# Patient Record
Sex: FEMALE | Race: WHITE | NOT HISPANIC OR LATINO | Employment: UNEMPLOYED | URBAN - METROPOLITAN AREA
[De-identification: names, ages, dates, MRNs, and addresses within clinical notes are randomized per-mention and may not be internally consistent; named-entity substitution may affect disease eponyms.]

---

## 2024-06-17 ENCOUNTER — OFFICE VISIT (OUTPATIENT)
Dept: FAMILY MEDICINE CLINIC | Facility: CLINIC | Age: 39
End: 2024-06-17

## 2024-06-17 VITALS
SYSTOLIC BLOOD PRESSURE: 126 MMHG | BODY MASS INDEX: 39.72 KG/M2 | WEIGHT: 238.4 LBS | HEART RATE: 76 BPM | DIASTOLIC BLOOD PRESSURE: 74 MMHG | HEIGHT: 65 IN | OXYGEN SATURATION: 98 %

## 2024-06-17 DIAGNOSIS — R10.9 FLANK PAIN: ICD-10-CM

## 2024-06-17 DIAGNOSIS — M25.50 ARTHRALGIA, UNSPECIFIED JOINT: ICD-10-CM

## 2024-06-17 DIAGNOSIS — F41.8 ANXIETY WITH DEPRESSION: Primary | ICD-10-CM

## 2024-06-17 DIAGNOSIS — R10.2 PELVIC PAIN: ICD-10-CM

## 2024-06-17 DIAGNOSIS — R53.83 OTHER FATIGUE: ICD-10-CM

## 2024-06-17 LAB
BILIRUB BLD-MCNC: NEGATIVE MG/DL
CLARITY, POC: CLEAR
COLOR UR: YELLOW
GLUCOSE UR STRIP-MCNC: NEGATIVE MG/DL
KETONES UR QL: NEGATIVE
LEUKOCYTE EST, POC: NEGATIVE
NITRITE UR-MCNC: NEGATIVE MG/ML
PH UR: 8 [PH] (ref 5–8)
PROT UR STRIP-MCNC: NEGATIVE MG/DL
RBC # UR STRIP: NEGATIVE /UL
SP GR UR: 1.01 (ref 1–1.03)
UROBILINOGEN UR QL: NORMAL

## 2024-06-17 PROCEDURE — 99203 OFFICE O/P NEW LOW 30 MIN: CPT | Performed by: NURSE PRACTITIONER

## 2024-06-17 PROCEDURE — 81002 URINALYSIS NONAUTO W/O SCOPE: CPT | Performed by: NURSE PRACTITIONER

## 2024-06-17 RX ORDER — ESCITALOPRAM OXALATE 20 MG/1
20 TABLET ORAL DAILY
COMMUNITY
Start: 2016-03-17

## 2024-06-17 NOTE — PROGRESS NOTES
Chief Complaint  Establish Care (New to the area from [place], has been very fatigue and feels like she's just falling apart. She's very tired and has pain in lower right side. )    Subjective        Lynn Richardson presents to [facility]  History of Present Illness    Presents today to establish care as a new patient.  She was previously living in [place].  Did not have a primary care there.  Hasn't seen anyone other than ob/gyn for a while.      A little bit before COVID started getting frequent UTI's.  That has gotten better.   Recently has been getting random hives if she hits her arm on anything.     Wakes up in the morning with joint pain and hands, feet, and face are swollen.     Has been having pain in lower right abdomen that radiates sometimes into right flank area.  That comes and goes. Has aunt and sister with ovarian cyst.  Seems like maybe worse when she is ovulating.      Depression/anxiety-lexapro 20mg does well to control. Has been back on for about a year  Denies SI or HI    Review of Systems   Constitutional:  Positive for fatigue. Negative for fever.   Respiratory:  Negative for cough, shortness of breath and wheezing.         When takes deep breath lungs burn last couple days, denies cold symptoms   Cardiovascular:  Negative for chest pain.   Gastrointestinal:  Positive for constipation (for last year) and nausea. Negative for abdominal pain, diarrhea and vomiting.        Hemorrhoids for last year   Genitourinary:  Positive for flank pain (from right pelvic pain into flank). Negative for dysuria and urgency.   Musculoskeletal:  Positive for joint swelling and myalgias.   Skin:         Hives at times, sometimes just last 10-15 min.  Sometimes will go days without.  Allergy medication helps   Neurological:  Positive for headaches (attributed to caffeine reductions). Negative for dizziness.   Psychiatric/Behavioral:  Negative for dysphoric mood and suicidal ideas.  "The patient is not nervous/anxious.            Objective   Vital Signs:  /74   Pulse 76   Ht 165.1 cm (65\")   Wt 108 kg (238 lb 6.4 oz)   SpO2 98%   BMI 39.67 kg/m²   Estimated body mass index is 39.67 kg/m² as calculated from the following:    Height as of this encounter: 165.1 cm (65\").    Weight as of this encounter: 108 kg (238 lb 6.4 oz).             Physical Exam  Constitutional:       Appearance: Normal appearance.   Skin:     General: Skin is warm and dry.   Neurological:      Mental Status: She is alert and oriented to person, place, and time.   Psychiatric:         Mood and Affect: Mood normal.         Behavior: Behavior normal.         Thought Content: Thought content normal.         Judgment: Judgment normal.        Result Review :                     Assessment and Plan     Diagnoses and all orders for this visit:    1. Anxiety with depression (Primary)  -     CBC & Differential  -     Comprehensive Metabolic Panel  -     TSH Rfx On Abnormal To Free T4  -     Rheumatoid Factor  -     Vitamin B12  -     Vitamin D,25-Hydroxy  -     FSH & LH  -     Cortisol  -     DWIGHT Direct Reflex to 11 [employer]  -     Cyclic citrul peptide antibody, IgG/IgA  -     CK  -     C-reactive protein  -     Sedimentation rate    2. Flank pain  -     POC Urinalysis Dipstick    3. Arthralgia, unspecified joint  -     CBC & Differential  -     Comprehensive Metabolic Panel  -     TSH Rfx On Abnormal To Free T4  -     Rheumatoid Factor  -     Vitamin B12  -     Vitamin D,25-Hydroxy  -     FSH & LH  -     Cortisol  -     DWIGHT Direct Reflex to 11 [employer]  -     Cyclic citrul peptide antibody, IgG/IgA  -     CK  -     C-reactive protein  -     Sedimentation rate    4. Other fatigue  -     CBC & Differential  -     Comprehensive Metabolic Panel  -     TSH Rfx On Abnormal To Free T4  -     Rheumatoid Factor  -     Vitamin B12  -     Vitamin D,25-Hydroxy  -     FSH & LH  -     Cortisol  -     DWIGHT Direct Reflex to 11 " Biomarker  -     Cyclic citrul peptide antibody, IgG/IgA  -     CK  -     C-reactive protein  -     Sedimentation rate    5. Pelvic pain  -     US Non-ob Transvaginal; Future  -     CBC & Differential  -     Comprehensive Metabolic Panel  -     TSH Rfx On Abnormal To Free T4  -     Rheumatoid Factor  -     Vitamin B12  -     Vitamin D,25-Hydroxy  -     FSH & LH  -     Cortisol  -     DWIGHT Direct Reflex to 11 [employer]arker  -     Cyclic citrul peptide antibody, IgG/IgA  -     CK  -     C-reactive protein  -     Sedimentation rate      Brief Urine Lab Results  (Last result in the past 365 days)        Color   Clarity   Blood   Leuk Est   Nitrite   Protein   CREAT   Urine HCG        06/17/24 1413 Yellow   Clear   Negative   Negative   Negative   Negative                 Continue Lexapro.  Patient does not need any refills currently will start with ultrasound for pelvic pain.    Would like to start with labs for review for arthritis and autoimmune panel.  Also checking other labs.  Will call with results any changes needed plan of care.                Follow Up     Return if symptoms worsen or fail to improve, for Next scheduled follow up.  Patient was given instructions and counseling regarding her condition or for health maintenance advice. Please see specific information pulled into the AVS if appropriate.

## 2024-06-19 LAB
25(OH)D3+25(OH)D2 SERPL-MCNC: 30.7 NG/ML (ref 30–100)
ALBUMIN SERPL-MCNC: 4.5 G/DL (ref 3.9–4.9)
ALP SERPL-CCNC: 82 IU/L (ref 44–121)
ALT SERPL-CCNC: 11 IU/L (ref 0–32)
ANA SER QL: NEGATIVE
AST SERPL-CCNC: 15 IU/L (ref 0–40)
BASOPHILS # BLD AUTO: 0 X10E3/UL (ref 0–0.2)
BASOPHILS NFR BLD AUTO: 0 %
BILIRUB SERPL-MCNC: 0.3 MG/DL (ref 0–1.2)
BUN SERPL-MCNC: 8 MG/DL (ref 6–24)
BUN/CREAT SERPL: 11 (ref 9–23)
CALCIUM SERPL-MCNC: 9.1 MG/DL (ref 8.7–10.2)
CCP IGA+IGG SERPL IA-ACNC: 4 UNITS (ref 0–19)
CHLORIDE SERPL-SCNC: 106 MMOL/L (ref 96–106)
CK SERPL-CCNC: 34 U/L (ref 32–182)
CO2 SERPL-SCNC: 18 MMOL/L (ref 20–29)
CORTIS SERPL-MCNC: 11.3 UG/DL (ref 6.2–19.4)
CREAT SERPL-MCNC: 0.74 MG/DL (ref 0.57–1)
CRP SERPL-MCNC: 13 MG/L (ref 0–10)
EGFRCR SERPLBLD CKD-EPI 2021: 104 ML/MIN/1.73
EOSINOPHIL # BLD AUTO: 0.1 X10E3/UL (ref 0–0.4)
EOSINOPHIL NFR BLD AUTO: 1 %
ERYTHROCYTE [DISTWIDTH] IN BLOOD BY AUTOMATED COUNT: 11.7 % (ref 11.7–15.4)
ERYTHROCYTE [SEDIMENTATION RATE] IN BLOOD BY WESTERGREN METHOD: 25 MM/HR (ref 0–32)
FSH SERPL-ACNC: 3.7 MIU/ML
GLOBULIN SER CALC-MCNC: 2.9 G/DL (ref 1.5–4.5)
GLUCOSE SERPL-MCNC: 88 MG/DL (ref 70–99)
HCT VFR BLD AUTO: 45.3 % (ref 34–46.6)
HGB BLD-MCNC: 14.4 G/DL (ref 11.1–15.9)
IMM GRANULOCYTES # BLD AUTO: 0 X10E3/UL (ref 0–0.1)
IMM GRANULOCYTES NFR BLD AUTO: 0 %
LH SERPL-ACNC: 6.6 MIU/ML
LYMPHOCYTES # BLD AUTO: 2.2 X10E3/UL (ref 0.7–3.1)
LYMPHOCYTES NFR BLD AUTO: 29 %
MCH RBC QN AUTO: 30.4 PG (ref 26.6–33)
MCHC RBC AUTO-ENTMCNC: 31.8 G/DL (ref 31.5–35.7)
MCV RBC AUTO: 96 FL (ref 79–97)
MONOCYTES # BLD AUTO: 0.6 X10E3/UL (ref 0.1–0.9)
MONOCYTES NFR BLD AUTO: 8 %
NEUTROPHILS # BLD AUTO: 4.5 X10E3/UL (ref 1.4–7)
NEUTROPHILS NFR BLD AUTO: 62 %
PLATELET # BLD AUTO: 224 X10E3/UL (ref 150–450)
POTASSIUM SERPL-SCNC: 3.8 MMOL/L (ref 3.5–5.2)
PROT SERPL-MCNC: 7.4 G/DL (ref 6–8.5)
RBC # BLD AUTO: 4.74 X10E6/UL (ref 3.77–5.28)
RHEUMATOID FACT SERPL-ACNC: 11 IU/ML
SODIUM SERPL-SCNC: 142 MMOL/L (ref 134–144)
TSH SERPL DL<=0.005 MIU/L-ACNC: 2.37 UIU/ML (ref 0.45–4.5)
VIT B12 SERPL-MCNC: 453 PG/ML (ref 232–1245)
WBC # BLD AUTO: 7.4 X10E3/UL (ref 3.4–10.8)

## 2024-06-19 NOTE — PROGRESS NOTES
Please call patient with results of labs.  Labs are all stable.  The only abnormal is her C-reactive protein which is  nonspecific inflammatory marker is very slightly elevated.  Arthritis and autoimmune panel are negative.  Patient should work hard on decreasing calories and carbs in diet and would look up anti-inflammatory diet.  If symptoms persist please follow-up as needed.

## 2024-06-21 ENCOUNTER — PATIENT ROUNDING (BHMG ONLY) (OUTPATIENT)
Dept: FAMILY MEDICINE CLINIC | Facility: CLINIC | Age: 39
End: 2024-06-21

## 2024-06-21 NOTE — PROGRESS NOTES
Sent Patient following in bettermarks Message:  My name is Rodríguez Bello      I am the Practice Manager with   [facility] [place]  [address], [address]  [facility] [id number] [phone].    And    [facility] [address],  [place], [place] [id number] [phone]    I am messaging to officially welcome you to our practice and ask about your recent visit.     How did you hear about our practice/providers?    Tell me about your visit with us. What things went well?         We're always looking for ways to make our patients' experiences even better. Do you have recommendations on ways we may improve?       Overall were you satisfied with your first visit to our practice?        Is there anything else I can do for you?     You may receive a survey from [employer] via text or email to provide feedback about your visit.  We ask that you please take a few minutes to complete the survey and let us know how we are doing.  If for any reason you feel unable to give us the highest rating please let me know.      Thank you, and have a great day.

## 2024-06-28 ENCOUNTER — OFFICE VISIT (OUTPATIENT)
Dept: FAMILY MEDICINE CLINIC | Facility: CLINIC | Age: 39
End: 2024-06-28

## 2024-06-28 VITALS
WEIGHT: 240.2 LBS | OXYGEN SATURATION: 97 % | BODY MASS INDEX: 40.02 KG/M2 | DIASTOLIC BLOOD PRESSURE: 70 MMHG | SYSTOLIC BLOOD PRESSURE: 108 MMHG | HEIGHT: 65 IN | HEART RATE: 90 BPM

## 2024-06-28 DIAGNOSIS — R31.9 URINARY TRACT INFECTION WITH HEMATURIA, SITE UNSPECIFIED: Primary | ICD-10-CM

## 2024-06-28 DIAGNOSIS — R30.0 DIFFICULT OR PAINFUL URINATION: ICD-10-CM

## 2024-06-28 DIAGNOSIS — N39.0 URINARY TRACT INFECTION WITH HEMATURIA, SITE UNSPECIFIED: Primary | ICD-10-CM

## 2024-06-28 LAB
BILIRUB BLD-MCNC: NEGATIVE MG/DL
CLARITY, POC: ABNORMAL
COLOR UR: YELLOW
EXPIRATION DATE: ABNORMAL
GLUCOSE UR STRIP-MCNC: NEGATIVE MG/DL
KETONES UR QL: NEGATIVE
LEUKOCYTE EST, POC: NEGATIVE
Lab: ABNORMAL
NITRITE UR-MCNC: NEGATIVE MG/ML
PH UR: 7 [PH] (ref 5–8)
PROT UR STRIP-MCNC: NEGATIVE MG/DL
RBC # UR STRIP: ABNORMAL /UL
SP GR UR: 1.01 (ref 1–1.03)
UROBILINOGEN UR QL: NORMAL

## 2024-06-28 RX ORDER — NITROFURANTOIN 25; 75 MG/1; MG/1
100 CAPSULE ORAL 2 TIMES DAILY
Qty: 14 CAPSULE | Refills: 0 | Status: SHIPPED | OUTPATIENT
Start: 2024-06-28

## 2024-06-28 NOTE — PROGRESS NOTES
"Chief Complaint  Difficulty Urinating    Subjective        Lynn Richardson presents to [facility]  History of Present Illness  Patient of Ryan Montilla is here today with a new concern.  She started 2 days ago with urinary frequency and painful urination.  She was also slightly nauseous last night and felt achy.  However, she is feeling a little better this morning.  She has had UTIs in the past and this seems similar.  She denies a history of kidney stones.  Patient also denies fever or chills      Objective   Vital Signs:  /70   Pulse 90   Ht 165.1 cm (65\")   Wt 109 kg (240 lb 3.2 oz)   SpO2 97%   BMI 39.97 kg/m²   Estimated body mass index is 39.97 kg/m² as calculated from the following:    Height as of this encounter: 165.1 cm (65\").    Weight as of this encounter: 109 kg (240 lb 3.2 oz).             Physical Exam  Vitals and nursing note reviewed.   Constitutional:       Appearance: Normal appearance. She is well-developed.   HENT:      Head: Normocephalic and atraumatic.   Eyes:      General: No scleral icterus.     Conjunctiva/sclera: Conjunctivae normal.   Cardiovascular:      Rate and Rhythm: Normal rate and regular rhythm.      Heart sounds: Normal heart sounds.   Pulmonary:      Effort: Pulmonary effort is normal.      Breath sounds: Normal breath sounds.   Abdominal:      General: Bowel sounds are normal. There is no distension.      Palpations: Abdomen is soft. There is no mass.      Tenderness: There is no abdominal tenderness. There is no right CVA tenderness, left CVA tenderness, guarding or rebound.      Hernia: No hernia is present.   Musculoskeletal:         General: Normal range of motion.      Cervical back: Normal range of motion and neck supple.      Right lower leg: No edema.      Left lower leg: No edema.   Skin:     General: Skin is warm and dry.      Capillary Refill: Capillary refill takes less than 2 seconds.      Findings: No rash. "   Neurological:      Mental Status: She is alert and oriented to person, place, and time.   Psychiatric:         Mood and Affect: Mood normal.         Behavior: Behavior normal.         Thought Content: Thought content normal.         Judgment: Judgment normal.        Result Review :    The following data was reviewed by: Nohemi Beck DO on 06/28/2024:  Common labs          6/17/2024    14:33   Common Labs   Glucose 88    BUN 8    Creatinine 0.74    Sodium 142    Potassium 3.8    Chloride 106    Calcium 9.1    Total Protein 7.4    Albumin 4.5    Total Bilirubin 0.3    Alkaline Phosphatase 82    AST (SGOT) 15    ALT (SGPT) 11    WBC 7.4    Hemoglobin 14.4    Hematocrit 45.3    Platelets 224      TSH          6/17/2024    14:33   TSH   TSH 2.370                   Assessment and Plan     Diagnoses and all orders for this visit:    1. Urinary tract infection with hematuria, site unspecified (Primary)  -     nitrofurantoin, macrocrystal-monohydrate, (Macrobid) 100 MG capsule; Take 1 capsule by mouth 2 (Two) Times a Day.  Dispense: 14 capsule; Refill: 0    2. Difficult or painful urination  -     POC Urinalysis Dipstick, Automated  -     Urine Culture - Urine, Urine, Clean Catch; Future  -     Urine Culture - Urine, Urine, Clean Catch    Patient is here today with an acute UTI.  Urinalysis shows blood but the patient is also on her menstrual cycle.  Urine culture will be sent.  Empiric treatment with Macrobid was sent to the pharmacy.  Patient was advised to avoid alcohol and caffeine until her symptoms are improved.  She should increase water intake.  If symptoms are worsening or persistent after antibiotic she should follow-up.         Follow Up     Return if symptoms worsen or fail to improve.  Patient was given instructions and counseling regarding her condition or for health maintenance advice. Please see specific information pulled into the AVS if appropriate.

## 2024-07-03 LAB
BACTERIA UR CULT: ABNORMAL
BACTERIA UR CULT: ABNORMAL
OTHER ANTIBIOTIC SUSC ISLT: ABNORMAL

## 2024-10-23 ENCOUNTER — TELEPHONE (OUTPATIENT)
Dept: FAMILY MEDICINE CLINIC | Facility: CLINIC | Age: 39
End: 2024-10-23

## 2024-11-20 ENCOUNTER — OFFICE VISIT (OUTPATIENT)
Dept: FAMILY MEDICINE CLINIC | Facility: CLINIC | Age: 39
End: 2024-11-20

## 2024-11-20 VITALS
WEIGHT: 235.4 LBS | DIASTOLIC BLOOD PRESSURE: 80 MMHG | HEART RATE: 71 BPM | BODY MASS INDEX: 39.22 KG/M2 | OXYGEN SATURATION: 99 % | SYSTOLIC BLOOD PRESSURE: 128 MMHG | HEIGHT: 65 IN

## 2024-11-20 DIAGNOSIS — F41.9 ANXIETY: ICD-10-CM

## 2024-11-20 DIAGNOSIS — Z12.31 SCREENING MAMMOGRAM FOR BREAST CANCER: ICD-10-CM

## 2024-11-20 DIAGNOSIS — Z13.220 ENCOUNTER FOR LIPID SCREENING FOR CARDIOVASCULAR DISEASE: ICD-10-CM

## 2024-11-20 DIAGNOSIS — Z13.1 SCREENING FOR DIABETES MELLITUS: ICD-10-CM

## 2024-11-20 DIAGNOSIS — R30.0 DYSURIA: ICD-10-CM

## 2024-11-20 DIAGNOSIS — Z00.00 ENCOUNTER FOR WELLNESS EXAMINATION IN ADULT: Primary | ICD-10-CM

## 2024-11-20 DIAGNOSIS — Z13.6 ENCOUNTER FOR LIPID SCREENING FOR CARDIOVASCULAR DISEASE: ICD-10-CM

## 2024-11-20 LAB
BILIRUB BLD-MCNC: NEGATIVE MG/DL
CLARITY, POC: ABNORMAL
COLOR UR: YELLOW
EXPIRATION DATE: ABNORMAL
GLUCOSE UR STRIP-MCNC: NEGATIVE MG/DL
KETONES UR QL: NEGATIVE
LEUKOCYTE EST, POC: NEGATIVE
Lab: ABNORMAL
NITRITE UR-MCNC: NEGATIVE MG/ML
PH UR: 6 [PH] (ref 5–8)
PROT UR STRIP-MCNC: NEGATIVE MG/DL
RBC # UR STRIP: NEGATIVE /UL
SP GR UR: 1.01 (ref 1–1.03)
UROBILINOGEN UR QL: NORMAL

## 2024-11-20 RX ORDER — CITALOPRAM HYDROBROMIDE 10 MG/1
10 TABLET ORAL DAILY
Qty: 60 TABLET | Refills: 1 | Status: SHIPPED | OUTPATIENT
Start: 2024-11-20
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
## 2024-11-21 LAB
ALBUMIN SERPL-MCNC: 4.6 G/DL (ref 3.9–4.9)
ALP SERPL-CCNC: 81 IU/L (ref 44–121)
ALT SERPL-CCNC: 9 IU/L (ref 0–32)
AST SERPL-CCNC: 13 IU/L (ref 0–40)
BILIRUB SERPL-MCNC: 0.3 MG/DL (ref 0–1.2)
BUN SERPL-MCNC: 12 MG/DL (ref 6–24)
BUN/CREAT SERPL: 16 (ref 9–23)
CALCIUM SERPL-MCNC: 10.1 MG/DL (ref 8.7–10.2)
CHLORIDE SERPL-SCNC: 102 MMOL/L (ref 96–106)
CHOLEST SERPL-MCNC: 160 MG/DL (ref 100–199)
CO2 SERPL-SCNC: 22 MMOL/L (ref 20–29)
CREAT SERPL-MCNC: 0.77 MG/DL (ref 0.57–1)
EGFRCR SERPLBLD CKD-EPI 2021: 99 ML/MIN/1.73
GLOBULIN SER CALC-MCNC: 3.2 G/DL (ref 1.5–4.5)
GLUCOSE SERPL-MCNC: 88 MG/DL (ref 70–99)
HDLC SERPL-MCNC: 45 MG/DL
LDLC SERPL CALC-MCNC: 100 MG/DL (ref 0–99)
POTASSIUM SERPL-SCNC: 4 MMOL/L (ref 3.5–5.2)
PROT SERPL-MCNC: 7.8 G/DL (ref 6–8.5)
SODIUM SERPL-SCNC: 139 MMOL/L (ref 134–144)
TRIGL SERPL-MCNC: 77 MG/DL (ref 0–149)
TSH SERPL DL<=0.005 MIU/L-ACNC: 4.36 UIU/ML (ref 0.45–4.5)
VLDLC SERPL CALC-MCNC: 15 MG/DL (ref 5–40)

## 2025-01-16 ENCOUNTER — OFFICE VISIT (OUTPATIENT)
Dept: FAMILY MEDICINE CLINIC | Facility: CLINIC | Age: 40
End: 2025-01-16
Payer: MEDICAID

## 2025-01-16 VITALS
BODY MASS INDEX: 39.02 KG/M2 | SYSTOLIC BLOOD PRESSURE: 102 MMHG | OXYGEN SATURATION: 98 % | HEIGHT: 65 IN | DIASTOLIC BLOOD PRESSURE: 64 MMHG | HEART RATE: 87 BPM | WEIGHT: 234.2 LBS

## 2025-01-16 DIAGNOSIS — F41.9 ANXIETY: ICD-10-CM

## 2025-01-16 RX ORDER — CITALOPRAM HYDROBROMIDE 10 MG/1
10 TABLET ORAL DAILY
Qty: 90 TABLET | Refills: 3 | Status: SHIPPED | OUTPATIENT
Start: 2025-01-16

## 2025-01-16 NOTE — PROGRESS NOTES
"Chief Complaint  Anxiety    Subjective        Lynn Richardson presents to [facility]  History of Present Illness    Patient is here today for follow up on anxiety.  Started on citalopram 10mg.  Feels like it is working well for her. Denies side effects.    Bruising may be a bit worse, but not nearly as bad as on lexapro.         Objective   Vital Signs:  /64   Pulse 87   Ht 165.1 cm (65\")   Wt 106 kg (234 lb 3.2 oz)   SpO2 98%   BMI 38.97 kg/m²   Estimated body mass index is 38.97 kg/m² as calculated from the following:    Height as of this encounter: 165.1 cm (65\").    Weight as of this encounter: 106 kg (234 lb 3.2 oz).          Physical Exam  Constitutional:       Appearance: Normal appearance.   Cardiovascular:      Rate and Rhythm: Normal rate and regular rhythm.      Pulses: Normal pulses.   Pulmonary:      Effort: Pulmonary effort is normal.      Breath sounds: Normal breath sounds.   Skin:     General: Skin is warm and dry.   Neurological:      Mental Status: She is alert.   Psychiatric:         Mood and Affect: Mood normal.         Behavior: Behavior normal.         Thought Content: Thought content normal.         Judgment: Judgment normal.        Result Review :                Assessment and Plan   Diagnoses and all orders for this visit:    1. Anxiety  -     citalopram (CeleXA) 10 MG tablet; Take 1 tablet by mouth Daily. May increase dosage to 20 mg after 2 weeks  Dispense: 90 tablet; Refill: 3      Would like to continue current medication.  Will refill.  If any changes in mood or anxiety or any new side effects follow-up with me as needed.  Otherwise follow-up yearly for physical and sooner as needed.       Follow Up   Return in about 1 year (around 1/16/2026) for Annual physical.  Patient was given instructions and counseling regarding her condition or for health maintenance advice. Please see specific information pulled into the AVS if appropriate. "

## 2025-02-21 ENCOUNTER — TELEPHONE (OUTPATIENT)
Dept: FAMILY MEDICINE CLINIC | Facility: CLINIC | Age: 40
End: 2025-02-21
Payer: MEDICAID